# Patient Record
(demographics unavailable — no encounter records)

---

## 2025-03-03 NOTE — HISTORY OF PRESENT ILLNESS
[FreeTextEntry1] : 66yo M in the office for evaluation of rheumatoid arthritis  History: Dec 2024 developed acute Right knee pain spontaneously  no traumas pain was very intense sharp no radiation no skin rashes ortho eval, Steroid injection on right knee, 850 % clinical improvement no locking no giving away due to his reports of RA referred to rheumatology  RA Initial medical impression 15 years ago hand joint complains, described hand predominant symptoms and other arthralgias Prescribed Humira for 5 years follow up interrupted no DMARDs in the past 5 years at least at this time no synovitis no clinical am stiffness no evident hand deformities